# Patient Record
Sex: FEMALE | Race: WHITE
[De-identification: names, ages, dates, MRNs, and addresses within clinical notes are randomized per-mention and may not be internally consistent; named-entity substitution may affect disease eponyms.]

---

## 2019-05-21 ENCOUNTER — HOSPITAL ENCOUNTER (OUTPATIENT)
Dept: HOSPITAL 61 - KCIC MRI | Age: 64
Discharge: HOME | End: 2019-05-21
Payer: COMMERCIAL

## 2019-05-21 DIAGNOSIS — S32.591A: Primary | ICD-10-CM

## 2019-05-21 DIAGNOSIS — M85.88: ICD-10-CM
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
## 2019-05-21 PROCEDURE — 72195 MRI PELVIS W/O DYE: CPT

## 2019-05-21 PROCEDURE — 73721 MRI JNT OF LWR EXTRE W/O DYE: CPT

## 2019-05-21 NOTE — [facility]
EXAM: 

MRI pelvis without IV contrast

MRI right hip without IV contrast. 

 

DATE: 5/21/2019 3:30 PM

 

CLINICAL INDICATION: Right hip pain, osteopenia, injury one week ago.

 

COMPARISON: None.

 

TECHNIQUE: The plantar, multisequence MR imaging of the right hip and 

pelvis was performed without IV contrast.

 

FINDINGS:

Nondisplaced fractures of the right superior and inferior pubic ramus as 

well as the bilateral sacral alar are seen with associated marrow edema. 

 

Moderate edema is seen within the short adductors as well as the adductor 

longus from strain. Associated periosteal edema is seen suggesting 

periosteal reaction/callus formation

 

Tendinous attachments of the gluteus medius and minimus are intact. No 

significant trochanteric bursitis. The hamstring and iliopsoas tendinous 

attachments are also intact. The direct and indirect heads of the rectus 

femoris are normal in signal and morphology, intact.

 

On this limited evaluation on either no definite pelvic mass, 

lymphadenopathy or ascites.

 

IMPRESSION: 

1.  Nondisplaced right superior and inferior pubic rami fractures as well 

as bilateral sacral alar fractures are seen.

2.  Low-intermediate grade strain of the right hip adductors

 

Electronically signed by: Mitchell Heath MD (5/21/2019 4:11 PM) [id number]

## 2019-05-21 NOTE — [facility]
EXAM: 

MRI pelvis without IV contrast

MRI right hip without IV contrast. 

 

DATE: 5/21/2019 3:30 PM

 

CLINICAL INDICATION: Right hip pain, osteopenia, injury one week ago.

 

COMPARISON: None.

 

TECHNIQUE: The plantar, multisequence MR imaging of the right hip and 

pelvis was performed without IV contrast.

 

FINDINGS:

Nondisplaced fractures of the right superior and inferior pubic ramus as 

well as the bilateral sacral alar are seen with associated marrow edema. 

 

Moderate edema is seen within the short adductors as well as the adductor 

longus from strain. Associated periosteal edema is seen suggesting 

periosteal reaction/callus formation

 

Tendinous attachments of the gluteus medius and minimus are intact. No 

significant trochanteric bursitis. The hamstring and iliopsoas tendinous 

attachments are also intact. The direct and indirect heads of the rectus 

femoris are normal in signal and morphology, intact.

 

On this limited evaluation on either no definite pelvic mass, 

lymphadenopathy or ascites.

 

IMPRESSION: 

1.  Nondisplaced right superior and inferior pubic rami fractures as well 

as bilateral sacral alar fractures are seen.

2.  Low-intermediate grade strain of the right hip adductors

 

Electronically signed by: Mitchell Heath MD (5/21/2019 4:11 PM) [facility]

## 2020-02-11 ENCOUNTER — HOSPITAL ENCOUNTER (EMERGENCY)
Dept: HOSPITAL 63 - ER | Age: 65
Discharge: HOME | End: 2020-02-11
Payer: COMMERCIAL

## 2020-02-11 VITALS — HEIGHT: 66 IN | BODY MASS INDEX: 28.66 KG/M2 | WEIGHT: 178.35 LBS

## 2020-02-11 VITALS — DIASTOLIC BLOOD PRESSURE: 79 MMHG | SYSTOLIC BLOOD PRESSURE: 137 MMHG
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
## 2020-02-11 DIAGNOSIS — S16.1XXA: Primary | ICD-10-CM

## 2020-02-11 DIAGNOSIS — S39.012A: ICD-10-CM
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
## 2020-02-11 PROCEDURE — 99284 EMERGENCY DEPT VISIT MOD MDM: CPT

## 2020-02-11 PROCEDURE — 72110 X-RAY EXAM L-2 SPINE 4/>VWS: CPT

## 2020-02-11 PROCEDURE — 72050 X-RAY EXAM NECK SPINE 4/5VWS: CPT
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
